# Patient Record
Sex: FEMALE | Race: OTHER | HISPANIC OR LATINO | URBAN - METROPOLITAN AREA
[De-identification: names, ages, dates, MRNs, and addresses within clinical notes are randomized per-mention and may not be internally consistent; named-entity substitution may affect disease eponyms.]

---

## 2021-07-07 ENCOUNTER — OUTPATIENT (OUTPATIENT)
Dept: INPATIENT UNIT | Facility: HOSPITAL | Age: 21
LOS: 1 days | Discharge: HOME | End: 2021-07-07
Payer: MEDICAID

## 2021-07-07 VITALS — HEART RATE: 76 BPM | SYSTOLIC BLOOD PRESSURE: 115 MMHG

## 2021-07-07 VITALS
HEART RATE: 81 BPM | SYSTOLIC BLOOD PRESSURE: 101 MMHG | RESPIRATION RATE: 18 BRPM | DIASTOLIC BLOOD PRESSURE: 65 MMHG | TEMPERATURE: 98 F

## 2021-07-07 DIAGNOSIS — O26.899 OTHER SPECIFIED PREGNANCY RELATED CONDITIONS, UNSPECIFIED TRIMESTER: ICD-10-CM

## 2021-07-07 DIAGNOSIS — Z3A.00 WEEKS OF GESTATION OF PREGNANCY NOT SPECIFIED: ICD-10-CM

## 2021-07-07 PROCEDURE — 99214 OFFICE O/P EST MOD 30 MIN: CPT

## 2021-07-07 NOTE — OB RN TRIAGE NOTE - NSNURSINGINSTR_OBGYN_ALL_OB_FT
pt evaluated for labor, no evidence of active labor.  pt d/c to home to home with instructions given by varinder herrera.

## 2021-07-07 NOTE — OB PROVIDER TRIAGE NOTE - NSHPPHYSICALEXAM_GEN_ALL_CORE
Vital Signs Last 24 Hrs  T(C): 36.4 (07 Jul 2021 22:01), Max: 36.4 (07 Jul 2021 22:01)  T(F): 97.5 (07 Jul 2021 22:01), Max: 97.5 (07 Jul 2021 22:01)  HR: 81 (07 Jul 2021 22:01) (76 - 81)  BP: 101/65 (07 Jul 2021 22:01) (101/65 - 115/-)  BP(mean): --  RR: 18 (07 Jul 2021 22:01) (18 - 18)  SpO2: --    TOCO: ctx irregular  NST"   VE: 1-2/50/-3, intact

## 2021-07-07 NOTE — OB PROVIDER TRIAGE NOTE - HISTORY OF PRESENT ILLNESS
21 yr old  @ 39 wks. presents with contractions since 11 am, Denies VB/LOF. Reports positive fetal movement. GBS results unknown. EFW 6/10 lbs. Reports 4 cm in office at 10 am  PNI: resolved subchorionic hematoma. ELLA 6 cm last week, today in office " normal"  Obhx: , 37 wks, 2018, 5.4 lbs  Gynhx: denies   Medhx: denies   Surghx: denies

## 2021-07-11 ENCOUNTER — INPATIENT (INPATIENT)
Facility: HOSPITAL | Age: 21
LOS: 1 days | Discharge: HOME | End: 2021-07-13
Attending: OBSTETRICS & GYNECOLOGY | Admitting: OBSTETRICS & GYNECOLOGY

## 2021-07-11 VITALS — TEMPERATURE: 98 F | RESPIRATION RATE: 16 BRPM

## 2021-07-11 DIAGNOSIS — Z3A.00 WEEKS OF GESTATION OF PREGNANCY NOT SPECIFIED: ICD-10-CM

## 2021-07-11 DIAGNOSIS — O26.899 OTHER SPECIFIED PREGNANCY RELATED CONDITIONS, UNSPECIFIED TRIMESTER: ICD-10-CM

## 2021-07-11 DIAGNOSIS — Z87.42 PERSONAL HISTORY OF OTHER DISEASES OF THE FEMALE GENITAL TRACT: Chronic | ICD-10-CM

## 2021-07-11 NOTE — OB PROVIDER TRIAGE NOTE - HISTORY OF PRESENT ILLNESS
Pt. is a 20y/o  EGA 39.4wks reports of painful contractions every few minutes pain 7/10. Pt. denies leakage of fluid and vaginal bleeding. Pt. reports good fetal movement.     AP: Resolved subchorionic hematoma    Medical Hx: Anemia, eczema     Surgical Hx: Denies    OBGYN Hx:    10/30/2018 5#4 @37wks   TOPx1     Meds: PNV, Iron    NKDA

## 2021-07-11 NOTE — OB PROVIDER TRIAGE NOTE - NSHPPHYSICALEXAM_GEN_ALL_CORE
ICU Vital Signs Last 24 Hrs  T(C): 36.9 (11 Jul 2021 22:49), Max: 36.9 (11 Jul 2021 22:46)  T(F): 98.4 (11 Jul 2021 22:49), Max: 98.42 (11 Jul 2021 22:46)  HR: 82 (11 Jul 2021 22:49) (82 - 82)  BP: 112/68 (11 Jul 2021 22:49) (112/68 - 112/68)  BP(mean): --  ABP: --  ABP(mean): --  RR: 16 (11 Jul 2021 22:49) (16 - 16)  SpO2: --    Abdomen soft nontender  SVE: 3/70/-3

## 2021-07-11 NOTE — OB RN TRIAGE NOTE - NSLDARRIVAL_OBGYN_ALL_OB_START_DATE
----- Message from David Herrera MD sent at 12/30/2020 11:26 AM EST -----  Mid back films show mild arthritis. Can consider PT course if agreeable. Low back films show mild arthritis and spurring, comparable to 2018. Neck films are ok, no change from 2015. 11-Jul-2021 22:44

## 2021-07-12 LAB
BASOPHILS # BLD AUTO: 0.02 K/UL — SIGNIFICANT CHANGE UP (ref 0–0.2)
BASOPHILS NFR BLD AUTO: 0.2 % — SIGNIFICANT CHANGE UP (ref 0–2)
BLD GP AB SCN SERPL QL: NEGATIVE — SIGNIFICANT CHANGE UP
COVID-19 SPIKE DOMAIN AB INTERP: POSITIVE
COVID-19 SPIKE DOMAIN ANTIBODY RESULT: 151 U/ML — HIGH
EOSINOPHIL # BLD AUTO: 0.06 K/UL — SIGNIFICANT CHANGE UP (ref 0–0.5)
EOSINOPHIL NFR BLD AUTO: 0.6 % — SIGNIFICANT CHANGE UP (ref 0–6)
HCT VFR BLD CALC: 37.6 % — SIGNIFICANT CHANGE UP (ref 34.5–45)
HGB BLD-MCNC: 12.2 G/DL — SIGNIFICANT CHANGE UP (ref 11.5–15.5)
IANC: 7.38 K/UL — SIGNIFICANT CHANGE UP (ref 1.5–8.5)
IMM GRANULOCYTES NFR BLD AUTO: 0.5 % — SIGNIFICANT CHANGE UP (ref 0–1.5)
LYMPHOCYTES # BLD AUTO: 1.77 K/UL — SIGNIFICANT CHANGE UP (ref 1–3.3)
LYMPHOCYTES # BLD AUTO: 17.5 % — SIGNIFICANT CHANGE UP (ref 13–44)
MCHC RBC-ENTMCNC: 28.1 PG — SIGNIFICANT CHANGE UP (ref 27–34)
MCHC RBC-ENTMCNC: 32.4 GM/DL — SIGNIFICANT CHANGE UP (ref 32–36)
MCV RBC AUTO: 86.6 FL — SIGNIFICANT CHANGE UP (ref 80–100)
MONOCYTES # BLD AUTO: 0.83 K/UL — SIGNIFICANT CHANGE UP (ref 0–0.9)
MONOCYTES NFR BLD AUTO: 8.2 % — SIGNIFICANT CHANGE UP (ref 2–14)
NEUTROPHILS # BLD AUTO: 7.38 K/UL — SIGNIFICANT CHANGE UP (ref 1.8–7.4)
NEUTROPHILS NFR BLD AUTO: 73 % — SIGNIFICANT CHANGE UP (ref 43–77)
NRBC # BLD: 0 /100 WBCS — SIGNIFICANT CHANGE UP
NRBC # FLD: 0 K/UL — SIGNIFICANT CHANGE UP
PLATELET # BLD AUTO: 180 K/UL — SIGNIFICANT CHANGE UP (ref 150–400)
RBC # BLD: 4.34 M/UL — SIGNIFICANT CHANGE UP (ref 3.8–5.2)
RBC # FLD: 15.9 % — HIGH (ref 10.3–14.5)
RH IG SCN BLD-IMP: POSITIVE — SIGNIFICANT CHANGE UP
RH IG SCN BLD-IMP: POSITIVE — SIGNIFICANT CHANGE UP
SARS-COV-2 IGG+IGM SERPL QL IA: 151 U/ML — HIGH
SARS-COV-2 IGG+IGM SERPL QL IA: POSITIVE
SARS-COV-2 RNA SPEC QL NAA+PROBE: SIGNIFICANT CHANGE UP
T PALLIDUM AB TITR SER: NEGATIVE — SIGNIFICANT CHANGE UP
WBC # BLD: 10.11 K/UL — SIGNIFICANT CHANGE UP (ref 3.8–10.5)
WBC # FLD AUTO: 10.11 K/UL — SIGNIFICANT CHANGE UP (ref 3.8–10.5)

## 2021-07-12 RX ORDER — DIBUCAINE 1 %
1 OINTMENT (GRAM) RECTAL EVERY 6 HOURS
Refills: 0 | Status: DISCONTINUED | OUTPATIENT
Start: 2021-07-12 | End: 2021-07-13

## 2021-07-12 RX ORDER — DIPHENHYDRAMINE HCL 50 MG
25 CAPSULE ORAL EVERY 6 HOURS
Refills: 0 | Status: DISCONTINUED | OUTPATIENT
Start: 2021-07-12 | End: 2021-07-13

## 2021-07-12 RX ORDER — OXYCODONE HYDROCHLORIDE 5 MG/1
5 TABLET ORAL
Refills: 0 | Status: DISCONTINUED | OUTPATIENT
Start: 2021-07-12 | End: 2021-07-13

## 2021-07-12 RX ORDER — IBUPROFEN 200 MG
600 TABLET ORAL EVERY 6 HOURS
Refills: 0 | Status: DISCONTINUED | OUTPATIENT
Start: 2021-07-12 | End: 2021-07-13

## 2021-07-12 RX ORDER — AER TRAVELER 0.5 G/1
1 SOLUTION RECTAL; TOPICAL EVERY 4 HOURS
Refills: 0 | Status: DISCONTINUED | OUTPATIENT
Start: 2021-07-12 | End: 2021-07-13

## 2021-07-12 RX ORDER — SIMETHICONE 80 MG/1
80 TABLET, CHEWABLE ORAL EVERY 4 HOURS
Refills: 0 | Status: DISCONTINUED | OUTPATIENT
Start: 2021-07-12 | End: 2021-07-13

## 2021-07-12 RX ORDER — OXYTOCIN 10 UNIT/ML
333.33 VIAL (ML) INJECTION
Qty: 20 | Refills: 0 | Status: DISCONTINUED | OUTPATIENT
Start: 2021-07-12 | End: 2021-07-12

## 2021-07-12 RX ORDER — IBUPROFEN 200 MG
600 TABLET ORAL EVERY 6 HOURS
Refills: 0 | Status: COMPLETED | OUTPATIENT
Start: 2021-07-12 | End: 2022-06-10

## 2021-07-12 RX ORDER — TETANUS TOXOID, REDUCED DIPHTHERIA TOXOID AND ACELLULAR PERTUSSIS VACCINE, ADSORBED 5; 2.5; 8; 8; 2.5 [IU]/.5ML; [IU]/.5ML; UG/.5ML; UG/.5ML; UG/.5ML
0.5 SUSPENSION INTRAMUSCULAR ONCE
Refills: 0 | Status: DISCONTINUED | OUTPATIENT
Start: 2021-07-12 | End: 2021-07-13

## 2021-07-12 RX ORDER — OXYCODONE HYDROCHLORIDE 5 MG/1
5 TABLET ORAL ONCE
Refills: 0 | Status: DISCONTINUED | OUTPATIENT
Start: 2021-07-12 | End: 2021-07-13

## 2021-07-12 RX ORDER — PRAMOXINE HYDROCHLORIDE 150 MG/15G
1 AEROSOL, FOAM RECTAL EVERY 4 HOURS
Refills: 0 | Status: DISCONTINUED | OUTPATIENT
Start: 2021-07-12 | End: 2021-07-13

## 2021-07-12 RX ORDER — ACETAMINOPHEN 500 MG
975 TABLET ORAL
Refills: 0 | Status: DISCONTINUED | OUTPATIENT
Start: 2021-07-12 | End: 2021-07-13

## 2021-07-12 RX ORDER — BENZOCAINE 10 %
1 GEL (GRAM) MUCOUS MEMBRANE EVERY 6 HOURS
Refills: 0 | Status: DISCONTINUED | OUTPATIENT
Start: 2021-07-12 | End: 2021-07-13

## 2021-07-12 RX ORDER — OXYTOCIN 10 UNIT/ML
VIAL (ML) INJECTION
Qty: 20 | Refills: 0 | Status: DISCONTINUED | OUTPATIENT
Start: 2021-07-12 | End: 2021-07-12

## 2021-07-12 RX ORDER — LANOLIN
1 OINTMENT (GRAM) TOPICAL EVERY 6 HOURS
Refills: 0 | Status: DISCONTINUED | OUTPATIENT
Start: 2021-07-12 | End: 2021-07-13

## 2021-07-12 RX ORDER — KETOROLAC TROMETHAMINE 30 MG/ML
30 SYRINGE (ML) INJECTION ONCE
Refills: 0 | Status: DISCONTINUED | OUTPATIENT
Start: 2021-07-12 | End: 2021-07-12

## 2021-07-12 RX ORDER — SODIUM CHLORIDE 9 MG/ML
3 INJECTION INTRAMUSCULAR; INTRAVENOUS; SUBCUTANEOUS EVERY 8 HOURS
Refills: 0 | Status: DISCONTINUED | OUTPATIENT
Start: 2021-07-12 | End: 2021-07-13

## 2021-07-12 RX ORDER — HYDROCORTISONE 1 %
1 OINTMENT (GRAM) TOPICAL EVERY 6 HOURS
Refills: 0 | Status: DISCONTINUED | OUTPATIENT
Start: 2021-07-12 | End: 2021-07-13

## 2021-07-12 RX ORDER — MAGNESIUM HYDROXIDE 400 MG/1
30 TABLET, CHEWABLE ORAL
Refills: 0 | Status: DISCONTINUED | OUTPATIENT
Start: 2021-07-12 | End: 2021-07-13

## 2021-07-12 RX ORDER — SODIUM CHLORIDE 9 MG/ML
1000 INJECTION, SOLUTION INTRAVENOUS
Refills: 0 | Status: DISCONTINUED | OUTPATIENT
Start: 2021-07-12 | End: 2021-07-12

## 2021-07-12 RX ADMIN — Medication 600 MILLIGRAM(S): at 14:01

## 2021-07-12 RX ADMIN — Medication 600 MILLIGRAM(S): at 12:58

## 2021-07-12 RX ADMIN — Medication 975 MILLIGRAM(S): at 14:00

## 2021-07-12 RX ADMIN — Medication 975 MILLIGRAM(S): at 18:54

## 2021-07-12 RX ADMIN — Medication 30 MILLIGRAM(S): at 04:53

## 2021-07-12 RX ADMIN — Medication 600 MILLIGRAM(S): at 18:54

## 2021-07-12 RX ADMIN — SODIUM CHLORIDE 3 MILLILITER(S): 9 INJECTION INTRAMUSCULAR; INTRAVENOUS; SUBCUTANEOUS at 06:38

## 2021-07-12 RX ADMIN — Medication 975 MILLIGRAM(S): at 23:25

## 2021-07-12 RX ADMIN — Medication 975 MILLIGRAM(S): at 12:59

## 2021-07-12 RX ADMIN — Medication 975 MILLIGRAM(S): at 18:11

## 2021-07-12 RX ADMIN — Medication 30 MILLIGRAM(S): at 06:38

## 2021-07-12 RX ADMIN — Medication 1 TABLET(S): at 12:58

## 2021-07-12 RX ADMIN — Medication 1000 MILLIUNIT(S)/MIN: at 04:53

## 2021-07-12 RX ADMIN — Medication 600 MILLIGRAM(S): at 23:25

## 2021-07-12 RX ADMIN — Medication 600 MILLIGRAM(S): at 18:10

## 2021-07-12 NOTE — OB RN DELIVERY SUMMARY - NSSELHIDDEN_OBGYN_ALL_OB_FT
[NS_DeliveryAttending1_OBGYN_ALL_OB_FT:[id number]],[NS_DeliveryAttending2_OBGYN_ALL_OB_FT:[id number]],[NS_DeliveryAssist1_OBGYN_ALL_OB_FT:[id number]=],[NS_DeliveryAssist2_OBGYN_ALL_OB_FT:[id number]=],[NS_DeliveryRN_OBGYN_ALL_OB_FT:[id number]]

## 2021-07-12 NOTE — OB PROVIDER DELIVERY SUMMARY - NSSELHIDDEN_OBGYN_ALL_OB_FT
[NS_DeliveryAttending1_OBGYN_ALL_OB_FT:[id number]==],[NS_DeliveryAttending2_OBGYN_ALL_OB_FT:[id number]],[NS_DeliveryAssist1_OBGYN_ALL_OB_FT:[id number]=],[NS_DeliveryAssist2_OBGYN_ALL_OB_FT:[id number]=]

## 2021-07-12 NOTE — OB PROVIDER H&P - ASSESSMENT
Discussed findings with Dr. Patton plan for repeat VE in two hours  @1:30am VE: 4/70/-3    Evidence of labor discussed findings with Dr. Patton.  -Admit to L&D  -Routine and COVID ordered   -Expectant management

## 2021-07-12 NOTE — CHART NOTE - NSCHARTNOTEFT_GEN_A_CORE
R2 Chart note    Pt seen at bedside for passage of clots 1hr after uncomplicated  . Pt denies headache, fever, lightheadedness, dizziness, CP, SOB, palpitations, abdominal pain and edema. Pt is resting comfortably in bed and not yet voided.    Vital Signs Last 24 Hrs  T(C): 36.5 (2021 04:32), Max: 36.9 (2021 22:46)  T(F): 97.7 (2021 04:32), Max: 98.42 (2021 22:46)  HR: 84 (2021 05:09) (78 - 122)  BP: 115/66 (2021 05:09) (99/51 - 124/72)  BP(mean): --  RR: 18 (2021 04:32) (16 - 18)  SpO2: 98% (2021 00:05) (88% - 99%)    Gen NAD  Abd soft nontender, fundus firm. Bimanual exam noted minimal membranes and few clots  Ext trace edema               12.2   10.11 )-----------( 180      ( 07-12 @ 03:17 )             37.6     A/P  21yo  PPD0 s/p   with passage of clots during recovery and exam revealed firm fundus with minimal clots.   - routine postpartum care  - continue to monitor bleeding/pad counts  - continue to monitor VS  - encourage PO intake    d/w Dr. Laz Paige PGY2 R2 Chart note    Pt seen at bedside for passage of clots 1hr after uncomplicated  . Pt denies headache, fever, lightheadedness, dizziness, CP, SOB, palpitations, abdominal pain and edema. Pt is resting comfortably in bed and not yet voided.    Vital Signs Last 24 Hrs  T(C): 36.5 (2021 04:32), Max: 36.9 (2021 22:46)  T(F): 97.7 (2021 04:32), Max: 98.42 (2021 22:46)  HR: 84 (2021 05:09) (78 - 122)  BP: 115/66 (2021 05:09) (99/51 - 124/72)  BP(mean): --  RR: 18 (2021 04:32) (16 - 18)  SpO2: 98% (2021 00:05) (88% - 99%)    Gen NAD  Abd soft nontender, fundus firm. Bimanual exam noted minimal membranes and few clots  Ext trace edema               12.2   10.11 )-----------( 180      ( 07-12 @ 03:17 )             37.6     A/P  21yo  PPD0 s/p   with passage of clots during recovery and exam revealed firm fundus with minimal clots.   - routine postpartum care  - continue to monitor bleeding/pad counts  - continue to monitor VS  - encourage PO intake    d/w Dr. Francis and Dr. Laz Paige PGY2

## 2021-07-12 NOTE — PROVIDER CONTACT NOTE (OTHER) - ASSESSMENT
patient with heavy bleeding, clots expressed. Fundus firm and at umbilicus. s/p  at 3043. EBl 200mL. Patient asymptomatic, denies pain, SOB, discomfort.

## 2021-07-12 NOTE — OB PROVIDER H&P - HISTORY OF PRESENT ILLNESS
Pt. is a 22y/o  EGA 39.4wks reports of painful contractions every few minutes pain 7/10. Pt. denies leakage of fluid and vaginal bleeding. Pt. reports good fetal movement.     AP: Resolved subchorionic hematoma    Medical Hx: Anemia, eczema     Surgical Hx: Denies    OBGYN Hx:    10/30/2018 5#4 @37wks   TOPx1     Meds: PNV, Iron    NKDA

## 2021-07-12 NOTE — OB PROVIDER LABOR PROGRESS NOTE - ASSESSMENT
Plan:  - Continue expectant management  - Con't EFM, Navarino  - Con't IVF  - Pt still deciding whether she would like epidural for pain management    D/w Dr. Penny Warren, PGY-1
Expectant mgmt    D/w Dr. Penny munoz pgy4

## 2021-07-12 NOTE — PROVIDER CONTACT NOTE (OTHER) - ACTION/TREATMENT ORDERED:
MD notified, assessed patient at bedside, manually expressed few clots. No further interventions needed at this time. Will continue to monitor

## 2021-07-12 NOTE — OB PROVIDER H&P - NSHPPHYSICALEXAM_GEN_ALL_CORE
ICU Vital Signs Last 24 Hrs  T(C): 36.9 (11 Jul 2021 22:49), Max: 36.9 (11 Jul 2021 22:46)  T(F): 98.4 (11 Jul 2021 22:49), Max: 98.42 (11 Jul 2021 22:46)  HR: 82 (11 Jul 2021 22:49) (82 - 82)  BP: 112/68 (11 Jul 2021 22:49) (112/68 - 112/68)  BP(mean): --  ABP: --  ABP(mean): --  RR: 16 (11 Jul 2021 22:49) (16 - 16)  SpO2: --    Abdomen soft nontender  SVE: 3/70/-3  Repeat VE @130am 4/70/-3  TAS: Cephalic presentation  GBS:   EFW: 3000gms by leopolds  FHR: 125bpm, moderate variability, accels, no decels   Jaswant every 3-5mins ICU Vital Signs Last 24 Hrs  T(C): 36.9 (11 Jul 2021 22:49), Max: 36.9 (11 Jul 2021 22:46)  T(F): 98.4 (11 Jul 2021 22:49), Max: 98.42 (11 Jul 2021 22:46)  HR: 82 (11 Jul 2021 22:49) (82 - 82)  BP: 112/68 (11 Jul 2021 22:49) (112/68 - 112/68)  BP(mean): --  ABP: --  ABP(mean): --  RR: 16 (11 Jul 2021 22:49) (16 - 16)  SpO2: --    Abdomen soft nontender  SVE: 3/70/-3  Repeat VE @130am 4/70/-3  TAS: Cephalic presentation  GBS: Neg. (6/9/21)  EFW: 3000gms by leopolds  FHR: 125bpm, moderate variability, accels, no decels   Jaswant every 3-5mins

## 2021-07-12 NOTE — OB RN DELIVERY SUMMARY - NS_SEPSISRSKCALC_OBGYN_ALL_OB_FT
EOS calculated successfully. EOS Risk Factor: 0.5/1000 live births ([facility] national incidence); GA=39w4d; Temp=98.42; ROM=0.05; GBS='Unknown'; Antibiotics='No antibiotics or any antibiotics < 2 hrs prior to birth'

## 2021-07-13 VITALS
SYSTOLIC BLOOD PRESSURE: 114 MMHG | HEART RATE: 80 BPM | DIASTOLIC BLOOD PRESSURE: 69 MMHG | OXYGEN SATURATION: 100 % | RESPIRATION RATE: 17 BRPM | TEMPERATURE: 98 F

## 2021-07-13 RX ORDER — ACETAMINOPHEN 500 MG
3 TABLET ORAL
Qty: 0 | Refills: 0 | DISCHARGE
Start: 2021-07-13

## 2021-07-13 RX ORDER — IBUPROFEN 200 MG
1 TABLET ORAL
Qty: 0 | Refills: 0 | DISCHARGE
Start: 2021-07-13

## 2021-07-13 RX ADMIN — Medication 600 MILLIGRAM(S): at 00:15

## 2021-07-13 RX ADMIN — Medication 975 MILLIGRAM(S): at 09:20

## 2021-07-13 RX ADMIN — Medication 975 MILLIGRAM(S): at 00:05

## 2021-07-13 RX ADMIN — Medication 975 MILLIGRAM(S): at 08:12

## 2021-07-13 NOTE — PROGRESS NOTE ADULT - SUBJECTIVE AND OBJECTIVE BOX
Patient seen and examined at bedside, no acute overnight events. No acute complaints, pain well controlled. Patient is ambulating, voiding spontaneously, passing gas, and tolerating regular diet. Denies CP, SOB, N/V, HA. Patient states that she is doing well and desires to go home today.    Vital Signs Last 24 Hours  T(C): 36.5 (07-13-21 @ 06:10), Max: 36.8 (07-12-21 @ 10:00)  HR: 91 (07-13-21 @ 06:10) (73 - 91)  BP: 119/66 (07-13-21 @ 06:10) (100/47 - 119/66)  RR: 18 (07-13-21 @ 06:10) (18 - 18)  SpO2: 100% (07-13-21 @ 06:10) (98% - 100%)    Physical Exam:  General: NAD  Abdomen: Soft, non-tender, non-distended, fundus firm  Pelvic: Lochia wnl    Labs:    Blood Type: A Positive  Antibody Screen: Negative  RPR: Negative               12.2   10.11 )-----------( 180      ( 07-12 @ 03:17 )             37.6         MEDICATIONS  (STANDING):  acetaminophen   Tablet .. 975 milliGRAM(s) Oral <User Schedule>  diphtheria/tetanus/pertussis (acellular) Vaccine (ADAcel) 0.5 milliLiter(s) IntraMuscular once  ibuprofen  Tablet. 600 milliGRAM(s) Oral every 6 hours  prenatal multivitamin 1 Tablet(s) Oral daily  sodium chloride 0.9% lock flush 3 milliLiter(s) IV Push every 8 hours    MEDICATIONS  (PRN):  benzocaine 20%/menthol 0.5% Spray 1 Spray(s) Topical every 6 hours PRN for Perineal discomfort  dibucaine 1% Ointment 1 Application(s) Topical every 6 hours PRN Perineal discomfort  diphenhydrAMINE 25 milliGRAM(s) Oral every 6 hours PRN Pruritus  hydrocortisone 1% Cream 1 Application(s) Topical every 6 hours PRN Moderate Pain (4-6)  lanolin Ointment 1 Application(s) Topical every 6 hours PRN nipple soreness  magnesium hydroxide Suspension 30 milliLiter(s) Oral two times a day PRN Constipation  oxyCODONE    IR 5 milliGRAM(s) Oral every 3 hours PRN Moderate to Severe Pain (4-10)  oxyCODONE    IR 5 milliGRAM(s) Oral once PRN Moderate to Severe Pain (4-10)  pramoxine 1%/zinc 5% Cream 1 Application(s) Topical every 4 hours PRN Moderate Pain (4-6)  simethicone 80 milliGRAM(s) Chew every 4 hours PRN Gas  witch hazel Pads 1 Application(s) Topical every 4 hours PRN Perineal discomfort

## 2021-07-13 NOTE — DISCHARGE NOTE OB - CARE PLAN
Principal Discharge DX:	Vaginal delivery  Goal:	Recovery  Assessment and plan of treatment:	After discharge, please stay on pelvic rest for 6 weeks, meaning no sexual intercourse, no tampons and no douching.  No driving for 2 weeks as women can loose a lot of blood during delivery and there is a possibility of being lightheaded/fainting.  No lifting objects heavier than baby for two weeks.  Expect to have vaginal bleeding/spotting for up to six weeks.  The bleeding should get lighter and more white/light brown with time.  For bleeding soaking more than a pad an hour or passing clots greater than the size of your fist, come in to the emergency department.    Follow up in clinic in 6 weeks.

## 2021-07-13 NOTE — DISCHARGE NOTE OB - PLAN OF CARE
After discharge, please stay on pelvic rest for 6 weeks, meaning no sexual intercourse, no tampons and no douching.  No driving for 2 weeks as women can loose a lot of blood during delivery and there is a possibility of being lightheaded/fainting.  No lifting objects heavier than baby for two weeks.  Expect to have vaginal bleeding/spotting for up to six weeks.  The bleeding should get lighter and more white/light brown with time.  For bleeding soaking more than a pad an hour or passing clots greater than the size of your fist, come in to the emergency department.    Follow up in clinic in 6 weeks. Recovery

## 2021-07-13 NOTE — DISCHARGE NOTE OB - CARE PROVIDER_API CALL
Lia Hester)  Obstetrics and Gynecology  [address], [address]  [place] [address]  Phone: [phone]  Fax: [phone]  Follow Up Time:

## 2021-07-13 NOTE — DISCHARGE NOTE OB - PATIENT PORTAL LINK FT
You can access the FollowMyHealth Patient Portal offered by [facility] by registering at the following website: [email or web address]. By joining [facility] FollowMyHealth portal, you will also be able to view your health information using other applications (apps) compatible with our system.

## 2021-07-13 NOTE — DISCHARGE NOTE OB - MATERIALS PROVIDED
[facility] [place] Screening Program/[place]  Immunization Record/Guide to Postpartum Care/Shaken Baby Prevention Handout/Discharge Medication Information for Patients and Families Pocket Guide

## 2021-07-13 NOTE — PROGRESS NOTE ADULT - ATTENDING COMMENTS
Associate Chief of L & D (Late entry)    I have not met this patient before today.  she is a patient pf [facility] and she was admitted in early labor by Dr Horan and delivered by Dr Francis    OB Progress Note:  PPD#1    S: 22yo  PPD#1 s/p . Patient denies any complaints    O:  Vital Signs Last 24 Hrs  T(C): 36.5 (2021 06:10), Max: 36.7 (2021 18:48)  T(F): 97.7 (2021 06:10), Max: 98 (2021 18:48)  HR: 91 (2021 06:10) (73 - 91)  BP: 119/66 (2021 06:10) (106/59 - 119/66)  RR: 18 (2021 06:10) (18 - 18)  SpO2: 100% (2021 06:10) (98% - 100%)    MEDICATIONS  (STANDING):  acetaminophen   Tablet .. 975 milliGRAM(s) Oral <User Schedule>  diphtheria/tetanus/pertussis (acellular) Vaccine (ADAcel) 0.5 milliLiter(s) IntraMuscular once  ibuprofen  Tablet. 600 milliGRAM(s) Oral every 6 hours  prenatal multivitamin 1 Tablet(s) Oral daily  sodium chloride 0.9% lock flush 3 milliLiter(s) IV Push every 8 hours      Labs:  Blood type: A Positive  Rubella IgG: RPR: Negative                          12.2   10.11 >-----------< 180    ( 12 @ 03:17 )             37.6      Physical Exam:    Abdomen: soft, fundus firm, NT, ND  Vaginal: Lochia scant  Extremities: No erythema/ trace edema    A/P: 22yo PPD#1 s/p     - Patient is stable for discharge and will follow up with Dr Marcelo Oseguera M.D., M.B.A., M.S.

## 2021-07-13 NOTE — DISCHARGE NOTE OB - MEDICATION SUMMARY - MEDICATIONS TO TAKE
I will START or STAY ON the medications listed below when I get home from the hospital:    ibuprofen 600 mg oral tablet  -- 1 tab(s) by mouth every 6 hours, As Needed  -- Indication: For for pain    acetaminophen 325 mg oral tablet  -- 3 tab(s) by mouth , As Needed  -- Indication: For for pain

## 2021-07-13 NOTE — DISCHARGE NOTE OB - HOSPITAL COURSE
Patient had uncomplicated vaginal delivery of a liveborn female. During postpartum course patient's vitals were stable, vaginal bleeding appropriate, and pain well controlled.  On day of discharge patient was ambulating, her pain controlled with oral medications, had adequate oral intake, and was voiding freely.  Discharge instructions and precautions were given.  Will return to clinic in 6 weeks for postpartum visit.  Patient to have nexplanon placed at postpartum visit for postpartum birth control.

## 2021-07-13 NOTE — PROGRESS NOTE ADULT - PROBLEM SELECTOR PLAN 1
- Continue with PO analgesia  - Increase ambulation  - Continue regular diet  - Plan for discharge today    Rika Chacon  PGY-1

## 2021-12-16 NOTE — OB PROVIDER H&P - NSPRIMARYCAREPROV_OBGYN_ALL_OB
GENERAL: no acute distress, non-toxic appearing, ill appearing  HEAD: normocephalic, atraumatic  HEENT: normal conjunctiva, oral mucosa dry, neck supple  CARDIAC: regular rate and rhythm, normal S1 and S2,  no appreciable murmurs  PULM: clear to ascultation bilaterally, no crackles, rales, rhonchi, or wheezing  GI: abdomen nondistended, soft, nontender, no guarding or rebound tenderness  : no CVA tenderness, no suprapubic tenderness  NEURO: alert and oriented x 3, normal speech, PERRLA, EOMI, no focal motor or sensory deficits  MSK: no visible deformities, no peripheral edema, calf tenderness/redness/swelling  SKIN: no visible rashes, dry  PSYCH: appropriate mood and affect MD//MARION/JOVANNA

## 2024-01-03 NOTE — DISCHARGE NOTE OB - BECAUSE OF A PHYSICAL, MENTAL OR EMOTIONAL CONDITION, DO YOU HAVE DIFFICULTY DOING  ERRANDS ALONE LIKE VISITING A DOCTOR'S OFFICE OR SHOPPING (15 YEARS AND OLDER)
[No Acute Distress] : no acute distress [Well Nourished] : well nourished [Well Developed] : well developed [Well-Appearing] : well-appearing [Normal Sclera/Conjunctiva] : normal sclera/conjunctiva [PERRL] : pupils equal round and reactive to light [EOMI] : extraocular movements intact [Normal Outer Ear/Nose] : the outer ears and nose were normal in appearance [Normal Oropharynx] : the oropharynx was normal [No JVD] : no jugular venous distention [No Lymphadenopathy] : no lymphadenopathy [Supple] : supple [Thyroid Normal, No Nodules] : the thyroid was normal and there were no nodules present [No Respiratory Distress] : no respiratory distress  [No Accessory Muscle Use] : no accessory muscle use [Clear to Auscultation] : lungs were clear to auscultation bilaterally [Normal Rate] : normal rate  [Regular Rhythm] : with a regular rhythm [Normal S1, S2] : normal S1 and S2 [No Murmur] : no murmur heard [No Carotid Bruits] : no carotid bruits [No Abdominal Bruit] : a ~M bruit was not heard ~T in the abdomen [No Varicosities] : no varicosities [Pedal Pulses Present] : the pedal pulses are present [No Edema] : there was no peripheral edema [No Palpable Aorta] : no palpable aorta [No Extremity Clubbing/Cyanosis] : no extremity clubbing/cyanosis [Soft] : abdomen soft [Non Tender] : non-tender [Non-distended] : non-distended [No Masses] : no abdominal mass palpated [No HSM] : no HSM [Normal Bowel Sounds] : normal bowel sounds [Normal Posterior Cervical Nodes] : no posterior cervical lymphadenopathy [Normal Anterior Cervical Nodes] : no anterior cervical lymphadenopathy [No CVA Tenderness] : no CVA  tenderness [No Spinal Tenderness] : no spinal tenderness [No Joint Swelling] : no joint swelling [Grossly Normal Strength/Tone] : grossly normal strength/tone [No Rash] : no rash [No Focal Deficits] : no focal deficits [Coordination Grossly Intact] : coordination grossly intact [Normal Gait] : normal gait [Deep Tendon Reflexes (DTR)] : deep tendon reflexes were 2+ and symmetric [Normal Affect] : the affect was normal [Normal Insight/Judgement] : insight and judgment were intact No

## 2025-01-29 NOTE — OB PROVIDER TRIAGE NOTE - NS_DILATION_OBGYN_ALL_OB_NU
CORTISONE INJECTION CARE    The injection site should never get red, hot, or swollen and if it does the patient will contact our office right away. The patient may experience a increase in soreness the first 24-48 hours due to a cortisone flair and can take anti-inflammatories for a short period of time to reduce that soreness. The patient should not submerge the injection site in water for a minimum of 24 hours to avoid infection. This means no lakes, pools, ponds, or hot tubs for 24 hours. If the patient is diabetic the injection may increase their blood sugar for up to one week. The patient can do this cortisone injection once every 4 months as needed.    
1.5